# Patient Record
Sex: MALE | Race: WHITE | Employment: FULL TIME | URBAN - METROPOLITAN AREA
[De-identification: names, ages, dates, MRNs, and addresses within clinical notes are randomized per-mention and may not be internally consistent; named-entity substitution may affect disease eponyms.]

---

## 2021-04-16 ENCOUNTER — APPOINTMENT (OUTPATIENT)
Dept: GENERAL RADIOLOGY | Age: 31
End: 2021-04-16
Payer: COMMERCIAL

## 2021-04-16 ENCOUNTER — HOSPITAL ENCOUNTER (EMERGENCY)
Age: 31
Discharge: HOME | End: 2021-04-16
Attending: EMERGENCY MEDICINE
Payer: COMMERCIAL

## 2021-04-16 VITALS
WEIGHT: 176.1 LBS | HEART RATE: 81 BPM | TEMPERATURE: 99.1 F | SYSTOLIC BLOOD PRESSURE: 144 MMHG | RESPIRATION RATE: 17 BRPM | DIASTOLIC BLOOD PRESSURE: 91 MMHG | OXYGEN SATURATION: 98 % | BODY MASS INDEX: 25.27 KG/M2

## 2021-04-16 DIAGNOSIS — S61.215A LACERATION OF LEFT RING FINGER WITHOUT FOREIGN BODY WITHOUT DAMAGE TO NAIL, INITIAL ENCOUNTER: Primary | ICD-10-CM

## 2021-04-16 DIAGNOSIS — S61.412A LACERATION OF LEFT PALM WITHOUT COMPLICATION, INITIAL ENCOUNTER: ICD-10-CM

## 2021-04-16 PROCEDURE — 73130 X-RAY EXAM OF HAND: CPT

## 2021-04-16 PROCEDURE — 12002 RPR S/N/AX/GEN/TRNK2.6-7.5CM: CPT

## 2021-04-16 PROCEDURE — 90471 IMMUNIZATION ADMIN: CPT | Performed by: EMERGENCY MEDICINE

## 2021-04-16 PROCEDURE — 6360000002 HC RX W HCPCS: Performed by: EMERGENCY MEDICINE

## 2021-04-16 PROCEDURE — 90715 TDAP VACCINE 7 YRS/> IM: CPT | Performed by: EMERGENCY MEDICINE

## 2021-04-16 PROCEDURE — 99283 EMERGENCY DEPT VISIT LOW MDM: CPT

## 2021-04-16 PROCEDURE — 2500000003 HC RX 250 WO HCPCS: Performed by: EMERGENCY MEDICINE

## 2021-04-16 RX ORDER — LIDOCAINE HYDROCHLORIDE 20 MG/ML
5 INJECTION, SOLUTION INFILTRATION; PERINEURAL ONCE
Status: COMPLETED | OUTPATIENT
Start: 2021-04-16 | End: 2021-04-16

## 2021-04-16 RX ADMIN — LIDOCAINE HYDROCHLORIDE 5 ML: 20 INJECTION, SOLUTION INFILTRATION; PERINEURAL at 13:16

## 2021-04-16 RX ADMIN — TETANUS TOXOID, REDUCED DIPHTHERIA TOXOID AND ACELLULAR PERTUSSIS VACCINE, ADSORBED 0.5 ML: 5; 2.5; 8; 8; 2.5 SUSPENSION INTRAMUSCULAR at 13:14

## 2021-04-16 NOTE — ED NOTES
Pt states that he was at work and cut his left hand on a bottle that he didn't realize was broken and unsure of last tetnus.       Alessia Rodríguez RN  04/16/21 6259

## 2021-04-16 NOTE — ED PROVIDER NOTES
[facility] EMERGENCY DEPT VISIT      Patient Identification  Laura Gibbs is a 32 y.o. male. Chief Complaint   Laceration      History of Present Illness: This is a  32 y.o. male who presents ambulatory  to the ED with complaints of lacerations to the left nondominant hand. Patient was at work opening a bottle and when he twisted the He believes that the glass underneath might have been broken and cut his palm and ring finger. He denies any loss of sensation or loss of movement. His last tetanus shot was over 5 years ago. Bleeding is controlled. No past medical history on file. No past surgical history on file. No current facility-administered medications for this encounter. No current outpatient medications on file. No Known Allergies    Social History     Socioeconomic History    Marital status: Single     Spouse name: Not on file    Number of children: Not on file    Years of education: Not on file    Highest education level: Not on file   Occupational History    Not on file   Social Needs    Financial resource strain: Not on file    Food insecurity     Worry: Not on file     Inability: Not on file    Transportation needs     Medical: Not on file     Non-medical: Not on file   Tobacco Use    Smoking status: Current Some Day Smoker   Substance and Sexual Activity    Alcohol use:  Yes    Drug use: No    Sexual activity: Not on file   Lifestyle    Physical activity     Days per week: Not on file     Minutes per session: Not on file    Stress: Not on file   Relationships    Social connections     Talks on phone: Not on file     Gets together: Not on file     Attends Restorationist service: Not on file     Active member of club or organization: Not on file     Attends meetings of clubs or organizations: Not on file     Relationship status: Not on file    Intimate partner violence     Fear of current or ex partner: Not on file     Emotionally abused: Not on file     Physically abused: Not on file     Forced sexual activity: Not on file   Other Topics Concern    Not on file   Social History Narrative    Not on file       Nursing Notes Reviewed      ROS:  General: no fever  Musculoskeletal: no arthralgia, no myalgia, no back pain,  no joint swelling  NEURO:  no numbness, no weakness  DERM: no rash, no erythema, no ecchymosis, +wounds      PHYSICAL EXAM:  GENERAL APPEARANCE: Hayley Butt is in no acute respiratory distress. Awake and alert. VITAL SIGNS:   ED Triage Vitals [04/16/21 1237]   Enc Vitals Group      BP (!) 144/91      Pulse 81      Resp 17      Temp 99.1 °F (37.3 °C)      Temp Source Oral      SpO2 98 %      Weight 176 lb 1.6 oz (79.9 kg)      Height       Head Circumference       Peak Flow       Pain Score       Pain Loc       Pain Edu? Excl. in [address]? HEAD: Normocephalic, atraumatic. EYES:  Extraocular muscles are intact. Conjunctivas are pink. Negative scleral icterus. ENT:  Mucous membranes are moist.    NECK: Nontender and supple. CHEST: normal effort  HEART:  Regular rate and rhythm. Strong and equal pulses in the upper extremities. MUSCULOSKELETAL:  Active range of motion of the upper and lower extremities. No edema. Finger flexion and extension strength is intact. Capillary refill normal.  Sensation intact. NEUROLOGICAL: Awake, alert and oriented x 3. Power intact in the upper and lower extremities. DERMATOLOGIC: No petechiae, rashes, or ecchymoses. 3cm lac left palm, no tendons visualized within the wound. 1.5 cm laceration to the palmar aspect of the distal phalanx of the left ring finger      ED COURSE AND MEDICAL DECISION MAKING:      Radiology:  All plain films have been evaluated by myself. They may have been overread by radiologist as noted in chart. Other radiologic studies (i.e. CT, MRI, ultrasounds, etc ) have been interpreted by radiologist.     XR HAND LEFT (MIN 3 VIEWS)   Final Result      1. No acute abnormality.           Labs:  No results found for this visit on 04/16/21. Treatment in the department:  Patient received   Medications   Tetanus-Diphth-Acell Pertussis (BOOSTRIX) injection 0.5 mL (0.5 mLs Intramuscular Given 4/16/21 1314)   lidocaine 2 % injection 5 mL (5 mLs Intradermal Given 4/16/21 1316)     Amelie Dancer or their surrogate had an opportunity to ask questions, and the risks, benefits, and alternatives were discussed. The wound located palm and ring finger was prepped and draped to maintain a sterile field. The wound was anesthetized with 2% lidocaine locallly to palm and via digital block to ring finger. It was copiously irrigated. It was explored to its depth in a bloodless field with no sign of tendon, nerve, or vascular injury. No foreign bodies were identified. Palm was closed with 8 5-0 ethilon sutures, finger closed with 4 5-0 ethilon sutures. There were no complications during the procedure. Medical decision making:  I estimate there is LOW risk for CELLULITIS, COMPARTMENT SYNDROME, NECROTIZING FASCIITIS, TENDON OR NEUROVASCULAR INJURY, or FOREIGN BODY, , thus I consider the discharge disposition reasonable. Yumiko Gray and I have discussed the diagnosis and risks, and we agree with discharging home to follow-up with their primary doctor. We also discussed returning to the Emergency Department immediately if new or worsening symptoms occur. We have discussed the symptoms which are most concerning (e.g., changing or worsening pain, fever, numbness, weakness, cool or painful digits) that necessitate immediate return      Clinical Impression:  1. Laceration of left ring finger without foreign body without damage to nail, initial encounter    2. Laceration of left palm without complication, initial encounter        Dispo:  Patient will be discharged  at this time. Patient was informed of this decision and agrees with plan. I have discussed lab and xray findings with patient and they understand.  Questions were

## 2021-04-16 NOTE — ED NOTES
Cleaned and irrigated with 250 ml solution of HIBI clens and NaCl, pt tolerated well. Left hand laceration and left ring finger.      [address], [place]  04/16/21 6241

## 2021-04-27 ENCOUNTER — HOSPITAL ENCOUNTER (EMERGENCY)
Age: 31
Discharge: HOME | End: 2021-04-27
Attending: EMERGENCY MEDICINE
Payer: COMMERCIAL

## 2021-04-27 VITALS
TEMPERATURE: 98.1 F | HEIGHT: 70 IN | HEART RATE: 58 BPM | DIASTOLIC BLOOD PRESSURE: 71 MMHG | WEIGHT: 172.25 LBS | RESPIRATION RATE: 12 BRPM | BODY MASS INDEX: 24.66 KG/M2 | SYSTOLIC BLOOD PRESSURE: 131 MMHG | OXYGEN SATURATION: 99 %

## 2021-04-27 DIAGNOSIS — Z48.02 VISIT FOR SUTURE REMOVAL: Primary | ICD-10-CM

## 2021-04-27 PROCEDURE — 99282 EMERGENCY DEPT VISIT SF MDM: CPT

## 2021-04-27 NOTE — ED PROVIDER NOTES
[facility] CHIEF COMPLAINT:   Chief Complaint   Patient presents with    Suture / Staple Removal         HPI: Marisa Stevens is a 32 y.o. male who presents to the Emergency Department with complaint of needing sutures removed from his left palm and left ring finger. Sutures were placed 11 days ago after he cut it on a glass bottle. Tetanus is up-to-date. He has no numbness or weakness. Denies fever or chills. REVIEW OF SYSTEMS:  6 systems reviewed. Pertinent positives per HPI. Otherwise noted to be negative. Nursing notes reviewed and agree with above. Past medical/surgical history reviewed. MEDICATIONS   There are no discharge medications for this patient. ALLERGIES No Known Allergies      /71   Pulse 58   Temp 98.1 °F (36.7 °C) (Oral)   Resp 12   Ht 5' 10\" (1.778 m)   Wt 172 lb 4 oz (78.1 kg)   SpO2 99%   BMI 24.72 kg/m²   General:  No acute distress. Non toxic appearance  Head:   Normocephalic and atraumatic  Eyes:   Conjunctiva clear, SHANEKA, EOM's intact. Sclera anicteric. ENT:   Mucous membranes moist  Neck:   Supple. No adenopathy. Lungs/Chest:  No respiratory distress  CVS:   Regular rate and rhythm  Extremities:  Full range of motion  Skin:   Sutured lacerations noted on the proximal left palm and on the left ring finger with skin edges well approximated and no evidence of drainage, bleeding or infection. Neuro:  Alert and OX3. Speech clear and appropriate. No extremity weakness. Normal sensation in all extremities. No facial asymmetry. Gait normal.  Psych:   Affect normal. Mood normal        RADIOLOGY:      LAB      ED COURSE / MDM:  77-year-old male here for suture removal of sutures placed for a laceration of the left palm and left fourth finger when he cut it on a glass bottle. Denies any pain. No fever or chills. Skin edges are well approximated. Sutures were removed from both lacerations by the patient's nurse and inspected by myself.   I did place a little bit of skin glue over both lacerations to try to give the skin edges a little more holding power. Recommended the patient rest and limit any repetitive grasping or bending for another 7 to 10 days. I discussed with Brett Goldman the results of evaluation in the Emergency Department, diagnosis, care and prognosis. The plan is to discharge to home. The patient is in agreement with the plan and questions have been answered. I also discussed with the patient and/or family the reasons which may require a return visit and the importance of follow-up care.        (Please note that portions of this note may have been completed with a voice recognition program.  Efforts were made to edit the dictation but occasionally words are mis-transcribed)        FINAL IMPRESSION:  1 --suture removal left hand           Eda Mccormack MD  04/27/21 4534